# Patient Record
Sex: MALE
[De-identification: names, ages, dates, MRNs, and addresses within clinical notes are randomized per-mention and may not be internally consistent; named-entity substitution may affect disease eponyms.]

---

## 2022-07-30 ENCOUNTER — TELEPHONE ENCOUNTER (OUTPATIENT)
Age: 62
End: 2022-07-30

## 2022-07-31 ENCOUNTER — TELEPHONE ENCOUNTER (OUTPATIENT)
Age: 62
End: 2022-07-31

## 2023-03-27 ENCOUNTER — DASHBOARD ENCOUNTERS (OUTPATIENT)
Age: 63
End: 2023-03-27

## 2023-03-27 ENCOUNTER — WEB ENCOUNTER (OUTPATIENT)
Dept: URBAN - METROPOLITAN AREA CLINIC 63 | Facility: CLINIC | Age: 63
End: 2023-03-27

## 2023-03-27 ENCOUNTER — OFFICE VISIT (OUTPATIENT)
Dept: URBAN - METROPOLITAN AREA CLINIC 63 | Facility: CLINIC | Age: 63
End: 2023-03-27

## 2023-03-27 RX ORDER — EZETIMIBE 10 MG/1
TABLET ORAL
Qty: 90 TABLET | Refills: 0 | Status: ACTIVE | COMMUNITY

## 2023-03-27 RX ORDER — SIMVASTATIN 20 MG/1
TAKE 1 TABLET BY MOUTH EVERY DAY AT DINNER TABLET, FILM COATED ORAL
Qty: 90 EACH | Refills: 2 | Status: ACTIVE | COMMUNITY

## 2023-03-27 RX ORDER — ERGOCALCIFEROL 1.25 MG/1
CAPSULE, LIQUID FILLED ORAL
Qty: 12 APPLICATOR | Refills: 0 | Status: ACTIVE | COMMUNITY

## 2023-03-27 RX ORDER — ALLOPURINOL 100 MG/1
TABLET ORAL
Qty: 90 TABLET | Refills: 2 | Status: ACTIVE | COMMUNITY

## 2023-03-27 RX ORDER — TELMISARTAN 80 MG/1
TAKE 1 TABLET BY MOUTH EVERY DAY TABLET ORAL
Qty: 60 EACH | Refills: 2 | Status: ACTIVE | COMMUNITY

## 2023-03-27 RX ORDER — AMLODIPINE BESYLATE 5 MG/1
TABLET ORAL
Qty: 90 TABLET | Refills: 2 | Status: ACTIVE | COMMUNITY

## 2023-03-27 RX ORDER — ALFUZOSIN HYDROCHLORIDE 10 MG/1
TAKE 1 TABLET BY MOUTH EVERY DAY TABLET, FILM COATED, EXTENDED RELEASE ORAL
Qty: 90 EACH | Refills: 1 | Status: ACTIVE | COMMUNITY

## 2023-03-27 NOTE — HPI-PREVIOUS LABS
FOBT dated 20 February 2023 is not detected. ********** Lab work dated 20 February 2023 demonstrates the following abnormalities: Absolute lymphocytes 702, vitamin D 25.  All remaining lab values of CBC, CMP, hemoglobin A1c, uric acid, urine proteins, TSH, B12, folate, PSA, lipid panel and urinalysis are negative or within normal limits.

## 2023-03-27 NOTE — HPI-TODAY'S VISIT:
New patient: elevated LFTs, fatty liver  PMH: HTN, HLD, gout,, osteoarthritis, obesity, fatty liver PSH: Herniorrhaphy x2, shoulder, hip, knee

## 2023-03-27 NOTE — HPI-PREVIOUS IMAGING
CT abdomen and pelvis without and with contrast/09 January 2023.  1.  Suspicious renal lesion; simple bilateral parapelvic and left cortical cysts; the largest left parapelvic cyst measures up to 5.1 cm.  2.  Circumferential wall thickening of the bladder, nonspecific but which can be secondary to prostatomegaly.  Mild prostatomegaly with a volume of 57 cc.  3.  Sigmoid diverticulosis without evidence of diverticulitis. ********** Ultrasound liver elastography/12 February 2022.  Median velocity of 1.22 m/s indicating low risk of hepatic fibrosis (METAVIR of F0 or F1). ********** Abdominal ultrasound/18 July 2022.  1.  Mild diffuse hepatic steatosis.  No cholelithiasis or biliary dilation.  2.  Suspected complex parapelvic cysts.  Follow-up was recommended.